# Patient Record
Sex: FEMALE | Race: WHITE | URBAN - METROPOLITAN AREA
[De-identification: names, ages, dates, MRNs, and addresses within clinical notes are randomized per-mention and may not be internally consistent; named-entity substitution may affect disease eponyms.]

---

## 2023-09-10 PROBLEM — N95.2 ATROPHIC VAGINITIS: Status: ACTIVE | Noted: 2023-09-10

## 2023-09-10 PROBLEM — J30.9 ALLERGIC RHINITIS: Status: ACTIVE | Noted: 2023-09-10

## 2023-09-10 PROBLEM — N94.10 DYSPAREUNIA IN FEMALE: Status: ACTIVE | Noted: 2023-09-10

## 2023-09-10 PROBLEM — J45.991 COUGH VARIANT ASTHMA (HHS-HCC): Status: ACTIVE | Noted: 2023-09-10

## 2023-09-10 RX ORDER — MOMETASONE FUROATE 220 UG/1
INHALANT RESPIRATORY (INHALATION)
COMMUNITY
Start: 2016-03-02

## 2023-09-10 RX ORDER — TRIAMCINOLONE ACETONIDE 55 UG/1
SPRAY, METERED NASAL
COMMUNITY
Start: 2013-11-05

## 2023-09-10 RX ORDER — FLUTICASONE PROPIONATE 50 MCG
2 SPRAY, SUSPENSION (ML) NASAL DAILY
COMMUNITY
Start: 2015-04-22

## 2024-04-16 ASSESSMENT — ENCOUNTER SYMPTOMS
ABDOMINAL PAIN: 0
DIZZINESS: 0
SHORTNESS OF BREATH: 0
COLOR CHANGE: 0
ABDOMINAL DISTENTION: 0
DYSURIA: 0
JOINT SWELLING: 0
HEADACHES: 0
DIFFICULTY URINATING: 0
ADENOPATHY: 0
CHEST TIGHTNESS: 0
ACTIVITY CHANGE: 0
WEAKNESS: 0
FATIGUE: 0

## 2024-04-16 NOTE — PROGRESS NOTES
Annual-menopause  Subjective   Daphne García is a 53 y.o. 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 Need mamm reports/records release signed to Dr. Harrington at [facility].  Complaints: ongoing vag dryness/painful sex. Ext hemorrhoid/prev bleeding, believes related to straining during wt training class.  denies vag bleed or discharge; denies pelvic  pain, pressure, or persistent bloating.  PMHx: Allergies (Dr. Cueva). Chronic rhinitis. Pedal edema, intermittent  Surg HX; Breast reduction, age 24 1995; skin cancer 2019  Father: alive 80 yrs,CABG in early 60s  Mother: alive 77 yrs  Occupation: [employer].  Cologuard  through pcp x 2 , both neg  Last pap 04/06/2022- NEG; HPV NEG   Mamm: yearly at mobile unit through [facility]; 07/2021- NEG PER PT ; [facility]  Menarche 13 . LMP age 44; vaso sxs couple yrs  STDs Never.  currently sexually active/denies exposure concerns.   Total preg 3 . C section(s) x  2.   Pregnancy # 1:  SAB.   Pregnancy # 2:  Primary C/S 6LBS 13 OZ.   Pregnancy # 3:  Repeat C/S.   Review of Systems   Constitutional:  Positive for unexpected weight change. Negative for activity change and fatigue.   Respiratory:  Negative for chest tightness and shortness of breath.    Cardiovascular:  Positive for leg swelling. Negative for chest pain.   Gastrointestinal:  Negative for abdominal distention and abdominal pain.   Genitourinary:  Positive for frequency and vaginal pain. Negative for difficulty urinating, dysuria, genital sores, pelvic pain, vaginal bleeding and vaginal discharge.   Musculoskeletal:  Negative for gait problem and joint swelling.   Skin:  Negative for color change and rash.        Very dry since menopause   Neurological:  Negative for dizziness, weakness and headaches.   Hematological:  Negative for adenopathy.   Objective Visit Vitals  /68   Wt 61.9 kg (136 lb 6.4 oz)   BMI 22.02 kg/m²   OB Status Postmenopausal   Smoking Status  Never   BSA 1.7 m²       General:   Alert and oriented, in no acute distress   Neck: Supple. No visible thyromegaly.    Breast/Axilla: Normal to palpation bilaterally without masses, skin changes, or nipple discharge. S/p mastopexy/surg reduction.   Abdomen: Soft, non-tender, without masses or organomegaly; prior incision intact   Vulva: Normal architecture without erythema, masses, or lesions.    Vagina:  mucosa without lesions, masses.  Moderate vault restriction/needs xs speculum; Positive atrophy. No abnormal vaginal discharge.    Cervix: Normal without masses, lesions, or signs of cervicitis.    Uterus: Normal mobile, non-enlarged uterus    Adnexa: No palpable  masses or tenderness   Pelvic Floor No POP noted. No high tone pelvic floor    Psych  Rectal Normal affect. Normal mood.   Ext hemorrhoid tag, 1-2 cm/no bleeding or thrombosis at present   Assessment/Plan   Encounter Diagnoses   Name Primary?    Visit for gynecologic examination; grossly benign  breast/gyn exams. Yes    Encounter for screening mammogram for malignant neoplasm of breast; completes every summer at [facility]; records release signed     Menopause; early-onset age 44; urogyn sxs severe/disruptive.     Postmenopausal atrophic vaginitis; detailed cause/progressive and permananet tissue chg=requires ongoing regular tx w vag ert  to improve sxs, along with hydration and lubes.     Dyspareunia in female; as above; written handouts given to review at home.       Barbara Hudson MD

## 2024-04-17 ENCOUNTER — OFFICE VISIT (OUTPATIENT)
Dept: OBSTETRICS AND GYNECOLOGY | Facility: CLINIC | Age: 54
End: 2024-04-17
Payer: COMMERCIAL

## 2024-04-17 VITALS — SYSTOLIC BLOOD PRESSURE: 110 MMHG | BODY MASS INDEX: 25.26 KG/M2 | WEIGHT: 156.5 LBS | DIASTOLIC BLOOD PRESSURE: 68 MMHG

## 2024-04-17 DIAGNOSIS — Z12.31 ENCOUNTER FOR SCREENING MAMMOGRAM FOR MALIGNANT NEOPLASM OF BREAST: ICD-10-CM

## 2024-04-17 DIAGNOSIS — N95.2 POSTMENOPAUSAL ATROPHIC VAGINITIS: ICD-10-CM

## 2024-04-17 DIAGNOSIS — Z01.419 VISIT FOR GYNECOLOGIC EXAMINATION: Primary | ICD-10-CM

## 2024-04-17 DIAGNOSIS — N94.10 DYSPAREUNIA IN FEMALE: ICD-10-CM

## 2024-04-17 DIAGNOSIS — Z78.0 MENOPAUSE: ICD-10-CM

## 2024-04-17 PROCEDURE — 99396 PREV VISIT EST AGE 40-64: CPT | Performed by: OBSTETRICS & GYNECOLOGY

## 2024-04-17 PROCEDURE — 1036F TOBACCO NON-USER: CPT | Performed by: OBSTETRICS & GYNECOLOGY

## 2024-04-17 RX ORDER — ESTRADIOL 0.1 MG/G
2 CREAM VAGINAL DAILY
COMMUNITY
Start: 2024-02-02 | End: 2024-04-17 | Stop reason: SDUPTHER

## 2024-04-17 RX ORDER — ESTRADIOL 0.1 MG/G
0.5 CREAM VAGINAL 3 TIMES WEEKLY
Qty: 42.5 G | Refills: 1 | Status: SHIPPED | OUTPATIENT
Start: 2024-04-17 | End: 2025-05-19

## 2024-04-17 ASSESSMENT — PATIENT HEALTH QUESTIONNAIRE - PHQ9
2. FEELING DOWN, DEPRESSED OR HOPELESS: NOT AT ALL
SUM OF ALL RESPONSES TO PHQ9 QUESTIONS 1 & 2: 0
1. LITTLE INTEREST OR PLEASURE IN DOING THINGS: NOT AT ALL

## 2024-04-17 ASSESSMENT — ENCOUNTER SYMPTOMS
FREQUENCY: 1
OCCASIONAL FEELINGS OF UNSTEADINESS: 0
UNEXPECTED WEIGHT CHANGE: 1
DEPRESSION: 0
LOSS OF SENSATION IN FEET: 0

## 2024-04-17 ASSESSMENT — LIFESTYLE VARIABLES
AUDIT-C TOTAL SCORE: 2
HOW OFTEN DO YOU HAVE A DRINK CONTAINING ALCOHOL: 2-4 TIMES A MONTH
HOW OFTEN DO YOU HAVE SIX OR MORE DRINKS ON ONE OCCASION: NEVER
SKIP TO QUESTIONS 9-10: 1
HOW MANY STANDARD DRINKS CONTAINING ALCOHOL DO YOU HAVE ON A TYPICAL DAY: 1 OR 2

## 2024-04-17 ASSESSMENT — PAIN SCALES - GENERAL: PAINLEVEL: 0-NO PAIN

## 2025-02-25 ENCOUNTER — HOSPITAL ENCOUNTER (OUTPATIENT)
Dept: RADIOLOGY | Facility: HOSPITAL | Age: 55
Discharge: HOME | End: 2025-02-25
Payer: COMMERCIAL

## 2025-02-25 DIAGNOSIS — Z13.820 ENCOUNTER FOR SCREENING FOR OSTEOPOROSIS: ICD-10-CM

## 2025-02-25 PROCEDURE — 77080 DXA BONE DENSITY AXIAL: CPT

## 2025-02-25 PROCEDURE — 77080 DXA BONE DENSITY AXIAL: CPT | Performed by: RADIOLOGY

## 2025-05-05 ASSESSMENT — ENCOUNTER SYMPTOMS
SHORTNESS OF BREATH: 0
WEAKNESS: 0
ACTIVITY CHANGE: 0
DYSURIA: 0
CHEST TIGHTNESS: 0
ABDOMINAL DISTENTION: 0
DIZZINESS: 0
UNEXPECTED WEIGHT CHANGE: 0
COLOR CHANGE: 0
HEADACHES: 0
FATIGUE: 0
ABDOMINAL PAIN: 0
DIFFICULTY URINATING: 0
JOINT SWELLING: 0
ADENOPATHY: 0

## 2025-05-05 NOTE — PROGRESS NOTES
"Annual-menopause  Subjective   Daphne García is a 54 y.o. 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   Complaints:   denies vag bleed or discharge; denies pelvic  pain, pressure, or persistent bloating.  Medical History[1] LMP age 44; GSM/dyspareunia; bleeding hemorrhoids; [facility]  Hx C/S x 3; sab x 1  Last pap 2022 neg/hpv neg  Last mamm 2024 neg; completed through [employer] mobile unit  DEXA 2025: Spine -1.0/hip 0.4/femoral neck -0.2  Cologuard 23 NEG  Review of Systems   Constitutional:  Negative for activity change, fatigue and unexpected weight change.   Respiratory:  Negative for chest tightness and shortness of breath.    Cardiovascular:  Negative for chest pain and leg swelling.   Gastrointestinal:  Negative for abdominal distention and abdominal pain.   Genitourinary:  Positive for dyspareunia and vaginal pain. Negative for difficulty urinating, dysuria, genital sores, pelvic pain, vaginal bleeding and vaginal discharge.   Musculoskeletal:  Negative for gait problem and joint swelling.   Skin:  Negative for color change and rash.   Neurological:  Negative for dizziness, weakness and headaches.   Hematological:  Negative for adenopathy.   Objective Visit Vitals  /70   Ht 1.676 m (5' 6\")   Wt 67.9 kg (149 lb 12.8 oz)   BMI 24.18 kg/m²   OB Status Postmenopausal   Smoking Status Former   BSA 1.78 m²       General:   Alert and oriented, in no acute distress   Neck: Supple. No visible thyromegaly.    Breast/Axilla: Normal to palpation bilaterally without masses, skin changes, or nipple discharge.    Abdomen: Soft, non-tender, without masses or organomegaly   Vulva: Normal architecture without erythema, masses, or lesions.    Vagina: mucosa without lesions, masses.  Positive atrophy.--Mucosa pale yellow centrally from underneath clitoral structure to just above urethral meatus/also pale and very thin at central posterior introitus;  no " visible blood or abnormal vaginal discharge.    Cervix: Normal without masses, lesions, or signs of cervicitis.  Pap sent   Uterus: Grossly normal mobile, non-enlarged uterus    Adnexa: No palpable  masses or tenderness   Pelvic Floor No POP noted. No high tone pelvic floor    Psych   Normal affect. Normal mood.      Assessment/Plan   Encounter Diagnoses   Name Primary?    Visit for gynecologic examination; no suspicious findings on current breast or pelvic exam Yes    Screening for human papillomavirus; Pap sent     Encounter for screening mammogram for malignant neoplasm of breast; patient gets orders from PCP at [facility]; due to complete August 2025.     Menopause; early menopause age 44; most bothersome symptoms are currently urogyn in nature/see below.     Dyspareunia in female; patient reports symptoms persisted by despite topical estrogen therapy; history and exam suggestive of vulvodynia/vestibulitis ; will try topical testosterone option; call placed to to compounding pharmacy [facility]; prescription for 0.2 mg daily to be administered intravaginally x 2 weeks, then maintenance at twice weekly.     Postmenopausal atrophic vaginitis; poor response to topical estrogen/see above.     Low libido; reviewed impact of physical discomfort on desire; will start with relieving physical discomfort, and if desire remains poor , consider referral to sexual medicine Dr. Da Silva.     Postmenopausal bone loss; prevention strategies reviewed     Screen for colon cancer; up-to-date negative      Detailed consult on dyspareunia/menopause.     Barbara Hudson MD           [1]   Past Medical History:  Diagnosis Date    Allergies

## 2025-05-07 ENCOUNTER — OFFICE VISIT (OUTPATIENT)
Dept: OBSTETRICS AND GYNECOLOGY | Facility: CLINIC | Age: 55
End: 2025-05-07
Payer: COMMERCIAL

## 2025-05-07 VITALS
BODY MASS INDEX: 24.08 KG/M2 | DIASTOLIC BLOOD PRESSURE: 70 MMHG | SYSTOLIC BLOOD PRESSURE: 100 MMHG | WEIGHT: 149.8 LBS | HEIGHT: 66 IN

## 2025-05-07 DIAGNOSIS — Z12.11 SCREEN FOR COLON CANCER: ICD-10-CM

## 2025-05-07 DIAGNOSIS — Z78.0 MENOPAUSE: ICD-10-CM

## 2025-05-07 DIAGNOSIS — N94.10 DYSPAREUNIA IN FEMALE: ICD-10-CM

## 2025-05-07 DIAGNOSIS — R68.82 LOW LIBIDO: ICD-10-CM

## 2025-05-07 DIAGNOSIS — N95.2 POSTMENOPAUSAL ATROPHIC VAGINITIS: ICD-10-CM

## 2025-05-07 DIAGNOSIS — M81.0 POSTMENOPAUSAL BONE LOSS: ICD-10-CM

## 2025-05-07 DIAGNOSIS — Z01.419 VISIT FOR GYNECOLOGIC EXAMINATION: Primary | ICD-10-CM

## 2025-05-07 DIAGNOSIS — Z12.31 ENCOUNTER FOR SCREENING MAMMOGRAM FOR MALIGNANT NEOPLASM OF BREAST: ICD-10-CM

## 2025-05-07 DIAGNOSIS — Z11.51 SCREENING FOR HUMAN PAPILLOMAVIRUS: ICD-10-CM

## 2025-05-07 PROCEDURE — 99396 PREV VISIT EST AGE 40-64: CPT | Performed by: OBSTETRICS & GYNECOLOGY

## 2025-05-07 PROCEDURE — 3008F BODY MASS INDEX DOCD: CPT | Performed by: OBSTETRICS & GYNECOLOGY

## 2025-05-07 PROCEDURE — 1036F TOBACCO NON-USER: CPT | Performed by: OBSTETRICS & GYNECOLOGY

## 2025-05-07 ASSESSMENT — LIFESTYLE VARIABLES
AUDIT-C TOTAL SCORE: 2
SKIP TO QUESTIONS 9-10: 1
HOW MANY STANDARD DRINKS CONTAINING ALCOHOL DO YOU HAVE ON A TYPICAL DAY: 1 OR 2
HOW OFTEN DO YOU HAVE A DRINK CONTAINING ALCOHOL: 2-4 TIMES A MONTH
HOW OFTEN DO YOU HAVE SIX OR MORE DRINKS ON ONE OCCASION: NEVER

## 2025-05-07 ASSESSMENT — ENCOUNTER SYMPTOMS
LOSS OF SENSATION IN FEET: 0
DEPRESSION: 0
OCCASIONAL FEELINGS OF UNSTEADINESS: 0

## 2025-05-07 ASSESSMENT — PAIN SCALES - GENERAL: PAINLEVEL_OUTOF10: 0-NO PAIN

## 2025-05-07 ASSESSMENT — PATIENT HEALTH QUESTIONNAIRE - PHQ9
SUM OF ALL RESPONSES TO PHQ9 QUESTIONS 1 & 2: 0
2. FEELING DOWN, DEPRESSED OR HOPELESS: NOT AT ALL
1. LITTLE INTEREST OR PLEASURE IN DOING THINGS: NOT AT ALL

## 2025-07-20 NOTE — PROGRESS NOTES
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  Last seen 5/7/2025 vulvodynia/vestibulitis--FAILED vag ERT; given trial topical testosterone: 0.2 mg inserted daily x 2 wks, then twice weekly.  Last pap 5/7/25 cyt neg/yeast/hpv  neg  Last mamm 8/5/2024 neg  Visit Vitals  /72   Ht 1.676 m (5' 6\")   Wt 68.5 kg (151 lb)   BMI 24.37 kg/m²   OB Status Postmenopausal   Smoking Status Former   BSA 1.79 m²   Constitutional/general: Well developed/Well nourished.  Neuro/psych: Oriented x 3; Mood/affect  Gyn: Patient declines today  Encounter Diagnoses   Name Primary?    Dyspareunia in female; continues to be an issue on days medication not used; plan to reinstitute another 2 weeks nightly testosterone cream use with increased dose to 0.4 mg, then wean back to twice weekly; patient may use estradiol/or hyaluronic acid gel on intervening days; continue use of lubricant with each coital encounter. Yes    Postmenopausal atrophic vaginitis; reviewed tissue changes are permanent and progressive; reviewed multiple FDA approved treatment options; choice limited by cost at present.     Low libido; reviewed currently available txs for low libido: Addyi and Vyleesi initially advised for  pre and  perimenopausal use, but now advised also for postmenopausal use by both menopause Society and [facility].  We discussed long history of testosterone use both topically and systemically; patient reports coworker trialing pellet therapy, and I have discussed FDA in menopause Society concerns with this form of systemic therapy/history of complications/hard to titrate.  Patient may explore this through functional medicine/med spa facility.  For now, it is our shared decision to try nightly oral supplement Addyi--reviewed precautions w alcohol and BP.  Time Based Billing 2025:          Prep time:  ___5__ minutes.          Additional history:  __10___ minutes.          Face to Face time w " patient/family/caregiver:  ___20__ minutes.          Counseling/Coordination of care:  ___5__ minutes.          Ordering medication/testing/procedures:  2____ minutes.          External communications:  ___0__ minutes.          Documentation time: 5 _minutes.          Total time on date of encounter:             Total minutes:  30    Spoke with patient by phone this morning; estimated price for at Addyi $3,000; pt declines from pharmacy; will trial female dose testim gel= 50 mg/5 gram gel; she will apply pea sized amount to inner thigh daily. Starting with 15 gm= 3 months supply; called to [facility].   Dr. Tristan Hudson MD

## 2025-07-23 ENCOUNTER — OFFICE VISIT (OUTPATIENT)
Dept: OBSTETRICS AND GYNECOLOGY | Facility: CLINIC | Age: 55
End: 2025-07-23
Payer: COMMERCIAL

## 2025-07-23 VITALS
SYSTOLIC BLOOD PRESSURE: 116 MMHG | BODY MASS INDEX: 24.27 KG/M2 | HEIGHT: 66 IN | WEIGHT: 151 LBS | DIASTOLIC BLOOD PRESSURE: 72 MMHG

## 2025-07-23 DIAGNOSIS — R68.82 LOW LIBIDO: ICD-10-CM

## 2025-07-23 DIAGNOSIS — N94.10 DYSPAREUNIA IN FEMALE: Primary | ICD-10-CM

## 2025-07-23 DIAGNOSIS — N95.2 POSTMENOPAUSAL ATROPHIC VAGINITIS: ICD-10-CM

## 2025-07-23 PROCEDURE — 99214 OFFICE O/P EST MOD 30 MIN: CPT | Performed by: OBSTETRICS & GYNECOLOGY

## 2025-07-23 PROCEDURE — 3008F BODY MASS INDEX DOCD: CPT | Performed by: OBSTETRICS & GYNECOLOGY

## 2025-07-23 RX ORDER — GLUCOSAMINE HCL 500 MG
TABLET ORAL EVERY 8 HOURS
COMMUNITY

## 2025-07-23 ASSESSMENT — LIFESTYLE VARIABLES
HOW MANY STANDARD DRINKS CONTAINING ALCOHOL DO YOU HAVE ON A TYPICAL DAY: 1 OR 2
HOW OFTEN DO YOU HAVE SIX OR MORE DRINKS ON ONE OCCASION: NEVER
SKIP TO QUESTIONS 9-10: 1
AUDIT-C TOTAL SCORE: 2
HOW OFTEN DO YOU HAVE A DRINK CONTAINING ALCOHOL: 2-4 TIMES A MONTH

## 2025-07-23 ASSESSMENT — PATIENT HEALTH QUESTIONNAIRE - PHQ9
SUM OF ALL RESPONSES TO PHQ9 QUESTIONS 1 & 2: 0
1. LITTLE INTEREST OR PLEASURE IN DOING THINGS: NOT AT ALL
2. FEELING DOWN, DEPRESSED OR HOPELESS: NOT AT ALL

## 2025-07-23 ASSESSMENT — ENCOUNTER SYMPTOMS
DEPRESSION: 0
OCCASIONAL FEELINGS OF UNSTEADINESS: 0
LOSS OF SENSATION IN FEET: 0

## 2025-07-23 ASSESSMENT — PAIN SCALES - GENERAL: PAINLEVEL_OUTOF10: 0-NO PAIN

## 2025-07-24 ENCOUNTER — PHARMACY VISIT (OUTPATIENT)
Dept: PHARMACY | Facility: CLINIC | Age: 55
End: 2025-07-24
Payer: COMMERCIAL

## 2025-07-24 DIAGNOSIS — R68.82 LOW LIBIDO: Primary | ICD-10-CM

## 2025-07-24 DIAGNOSIS — N95.2 POSTMENOPAUSAL ATROPHIC VAGINITIS: ICD-10-CM

## 2025-07-24 PROCEDURE — RXMED WILLOW AMBULATORY MEDICATION CHARGE

## 2025-07-24 RX ORDER — TESTOSTERONE 50 MG/5G
GEL TRANSDERMAL
Qty: 3 PACKET | Refills: 0 | Status: SHIPPED | OUTPATIENT
Start: 2025-07-24

## 2025-09-01 ASSESSMENT — ENCOUNTER SYMPTOMS
DYSURIA: 0
HEADACHES: 0
DIFFICULTY URINATING: 0
FATIGUE: 0
WEAKNESS: 0
ADENOPATHY: 0
JOINT SWELLING: 0
DIZZINESS: 0
SHORTNESS OF BREATH: 0
UNEXPECTED WEIGHT CHANGE: 0
ABDOMINAL PAIN: 0
ACTIVITY CHANGE: 0
COLOR CHANGE: 0
CHEST TIGHTNESS: 0
ABDOMINAL DISTENTION: 0

## 2025-09-03 ENCOUNTER — OFFICE VISIT (OUTPATIENT)
Dept: OBSTETRICS AND GYNECOLOGY | Facility: CLINIC | Age: 55
End: 2025-09-03
Payer: COMMERCIAL

## 2025-09-03 VITALS
WEIGHT: 150.4 LBS | BODY MASS INDEX: 24.17 KG/M2 | SYSTOLIC BLOOD PRESSURE: 100 MMHG | HEIGHT: 66 IN | DIASTOLIC BLOOD PRESSURE: 66 MMHG

## 2025-09-03 DIAGNOSIS — N95.2 POSTMENOPAUSAL ATROPHIC VAGINITIS: ICD-10-CM

## 2025-09-03 DIAGNOSIS — N94.10 DYSPAREUNIA IN FEMALE: ICD-10-CM

## 2025-09-03 DIAGNOSIS — R68.82 LOW LIBIDO: ICD-10-CM

## 2025-09-03 DIAGNOSIS — Z78.0 MENOPAUSE: Primary | ICD-10-CM

## 2025-09-03 PROCEDURE — 3008F BODY MASS INDEX DOCD: CPT | Performed by: OBSTETRICS & GYNECOLOGY

## 2025-09-03 PROCEDURE — 99214 OFFICE O/P EST MOD 30 MIN: CPT | Performed by: OBSTETRICS & GYNECOLOGY

## 2025-09-03 PROCEDURE — 99212 OFFICE O/P EST SF 10 MIN: CPT

## 2025-09-03 ASSESSMENT — LIFESTYLE VARIABLES
SKIP TO QUESTIONS 9-10: 1
AUDIT-C TOTAL SCORE: 2
HOW MANY STANDARD DRINKS CONTAINING ALCOHOL DO YOU HAVE ON A TYPICAL DAY: 1 OR 2
HOW OFTEN DO YOU HAVE SIX OR MORE DRINKS ON ONE OCCASION: NEVER
HOW OFTEN DO YOU HAVE A DRINK CONTAINING ALCOHOL: 2-4 TIMES A MONTH

## 2025-09-03 ASSESSMENT — PAIN SCALES - GENERAL: PAINLEVEL_OUTOF10: 0-NO PAIN

## 2025-09-03 ASSESSMENT — ENCOUNTER SYMPTOMS
DEPRESSION: 0
OCCASIONAL FEELINGS OF UNSTEADINESS: 0
LOSS OF SENSATION IN FEET: 0